# Patient Record
(demographics unavailable — no encounter records)

---

## 2025-05-30 NOTE — CONSULT LETTER
[Dear  ___] : Dear  [unfilled], [Courtesy Letter:] : I had the pleasure of seeing your patient, [unfilled], in my office today. [Please see my note below.] : Please see my note below. [Consult Closing:] : Thank you very much for allowing me to participate in the care of this patient.  If you have any questions, please do not hesitate to contact me. [Sincerely,] : Sincerely, [FreeTextEntry3] : William Condon Caro, DO PGY-4, Child Neurology [facility] [address], [address] [place] [address]    Dr. Nestor Gautam M.D. Child Neurology Attending Physician [facility] alvin [address], [place] [address]

## 2025-05-30 NOTE — PLAN
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

## 2025-05-30 NOTE — PHYSICAL EXAM
[Well-appearing] : well-appearing [Normocephalic] : normocephalic [No dysmorphic facial features] : no dysmorphic facial features [No ocular abnormalities] : no ocular abnormalities [Neck supple] : neck supple [Alert] : alert [Well related, good eye contact] : well related, good eye contact [Conversant] : conversant [Normal speech and language] : normal speech and language [Follows instructions well] : follows instructions well [VFF] : VFF [Pupils reactive to light and accommodation] : pupils reactive to light and accommodation [Full extraocular movements] : full extraocular movements [No nystagmus] : no nystagmus [Normal facial sensation to light touch] : normal facial sensation to light touch [No facial asymmetry or weakness] : no facial asymmetry or weakness [Gross hearing intact] : gross hearing intact [Equal palate elevation] : equal palate elevation [Good shoulder shrug] : good shoulder shrug [Normal tongue movement] : normal tongue movement [R handed] : R handed [Normal axial and appendicular muscle tone] : normal axial and appendicular muscle tone [Gets up on table without difficulty] : gets up on table without difficulty [No pronator drift] : no pronator drift [Normal finger tapping and fine finger movements] : normal finger tapping and fine finger movements [No abnormal involuntary movements] : no abnormal involuntary movements [5/5 strength in proximal and distal muscles of arms and legs] : 5/5 strength in proximal and distal muscles of arms and legs [Able to walk on heels] : able to walk on heels [Able to walk on toes] : able to walk on toes [2+ biceps] : 2+ biceps [Triceps] : triceps [Knee jerks] : knee jerks [Ankle jerks] : ankle jerks [No ankle clonus] : no ankle clonus [Localizes LT and temperature] : localizes LT and temperature [No dysmetria on FTNT] : no dysmetria on FTNT [Good walking balance] : good walking balance [Normal gait] : normal gait [Able to tandem well] : able to tandem well [Negative Romberg] : negative Romberg [L] : left [R] : right [de-identified] : Habitus consistent with achondroplasia.  [de-identified] : Breathing comfortably [de-identified] : Rhizomelia bilaterally [de-identified] : Likely secondary to withdrawal

## 2025-05-30 NOTE — HISTORY OF PRESENT ILLNESS
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 Continues to do well since last visit. He states that his strength in L hemibody is essentially back to baseline but does report increased fatiguability on the left side. Only complaint today is intermittent abdominal pain for the last five months. Mother also endorses intermittent headaches that typically self resolve and do not require medication. No new weakness or seizure activity reported. Has follow up appointments with neurosurgery and hematology within the next two weeks, mother reports that there are plans to decrease the settings on his  shunt. Continues to take low dose aspirin. Continues to receive ST 2x per week and OT at school 1x per week.  History Reviewed: "15 yo M with achondroplasia admitted in February 2023 with strep pneumonia, septic shock and meningitis, complicated with cavernous sinus thrombosis requiring anticoagulation, and hydrocephalus requiring  shunt placement on 3/13/23. The cavernous sinus thrombosis is a likely complication from the bacterial infection.  On 4/21/23, he developed AMS and was found to have cerebral infarcts on MRI w/ severe stenosis of the [place] of [place] on MRA and subsequent L hemiparesis. He underwent cerebral angiography and intracranial arterial verapamil and milrinone, as well as mechanical angioplasty of R carotid artery and R basilar artery. He was also treated w/ IV methylprednisolone for his vessel inflammation.  Repeat imaging 5/16/23 showed stable vessels on MRA but new small areas of ischemia on MRI He remained clinically stable and was d/c on po 60mg Prednisone, ppx LEV and Aspirin. Enoxaparin" Has since been weaned off of steroids and levetiracetam. 
Statement Selected

## 2025-05-30 NOTE — END OF VISIT
[] : Resident [Time Spent: ___ minutes] : I have spent [unfilled] minutes of time on the encounter which excludes teaching and separately reported services. [FreeTextEntry3] : Time billed for excludes time spent on teaching.

## 2025-05-30 NOTE — ASSESSMENT
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 Patient is doing well w/ improving strength in L hemibody that is essentially back to baseline. Discussed that Abner can discontinue Aspirin as it has been two years since stroke, or wait until upcoming follow up appointment with hematology to confirm discontinuation. Should continue with OT and ST through school. Will defer repeat head imaging to Neurosurgery or obtain more detailed scans if patient has any new complaints or symptoms.

## 2025-06-17 NOTE — HISTORY OF PRESENT ILLNESS
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   From inpatient hematology notes: 15 yo M with achondroplasia admitted in February 2023 with strep pneumonia, septic shock and meningitis, complicated with cavernous sinus thrombosis requiring anticoagulation, and hydrocephalus requiring  shunt placement on 3/13/23. The cavernous sinus thrombosis is a likely complication from the bacterial infection.   On 4/21/23, he developed AMS and was found to have cerebral infarcts on MRI w/ severe stenosis of the [place] on MRA and subsequent L hemiparesis. He underwent cerebral angiography and intracranial arterial verapamil and milrinone, as well as mechanical angioplasty of R carotid artery and R basilar artery. He was also treated w/ IV methylprednisolone for his vessel inflammation.   Repeat imaging 5/16/23 showed stable vessels on MRA but new small areas of ischemia on MRI He remained clinically stable and was ds/c on po 60mg Prednisone, ppx LEV and Aspirin. Enoxaparin   Lab work performed inpatient:  PT- 12.7 INR-1.09 aPTT- 39.6 Protein C Activity- 116 Protein S Antigen- 55 Antithrombin III Activity- 123 Prothrombin Gene Mutation- NEGATIVE Factor V Leiden- NEGATIVE   [de-identified] : Abner is here for follow up for history of stroke He is doing well No health issues and no new concernsStopped aspirin 3 weeks ago Followed up with Neuro Sx - also advised that he may not need aspirin No new headaches or blurry vision

## 2025-06-17 NOTE — PHYSICAL EXAM
[Normal] : affect appropriate [de-identified] : Short Stature  Clear bilaterally, pupils equal, round and reactive to light.

## 2025-06-17 NOTE — REASON FOR VISIT
[Follow-Up Visit] : a follow-up visit for [Patient] : patient [Mother] : mother [FreeTextEntry2] : Sinus Venous Thrombosis

## 2025-06-17 NOTE — HISTORY OF PRESENT ILLNESS
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   From inpatient hematology notes: 15 yo M with achondroplasia admitted in February 2023 with strep pneumonia, septic shock and meningitis, complicated with cavernous sinus thrombosis requiring anticoagulation, and hydrocephalus requiring  shunt placement on 3/13/23. The cavernous sinus thrombosis is a likely complication from the bacterial infection.   On 4/21/23, he developed AMS and was found to have cerebral infarcts on MRI w/ severe stenosis of the [place] of [place] on MRA and subsequent L hemiparesis. He underwent cerebral angiography and intracranial arterial verapamil and milrinone, as well as mechanical angioplasty of R carotid artery and R basilar artery. He was also treated w/ IV methylprednisolone for his vessel inflammation.   Repeat imaging 5/16/23 showed stable vessels on MRA but new small areas of ischemia on MRI He remained clinically stable and was ds/c on po 60mg Prednisone, ppx LEV and Aspirin. Enoxaparin   Lab work performed inpatient:  PT- 12.7 INR-1.09 aPTT- 39.6 Protein C Activity- 116 Protein S Antigen- 55 Antithrombin III Activity- 123 Prothrombin Gene Mutation- NEGATIVE Factor V Leiden- NEGATIVE   [de-identified] : Abner is here for follow up for history of stroke He is doing well No health issues and no new concernsStopped aspirin 3 weeks ago Followed up with Neuro Sx - also advised that he may not need aspirin No new headaches or blurry vision